# Patient Record
Sex: MALE | Employment: UNEMPLOYED | URBAN - METROPOLITAN AREA
[De-identification: names, ages, dates, MRNs, and addresses within clinical notes are randomized per-mention and may not be internally consistent; named-entity substitution may affect disease eponyms.]

---

## 2023-10-05 SDOH — HEALTH STABILITY: PHYSICAL HEALTH: ON AVERAGE, HOW MANY DAYS PER WEEK DO YOU ENGAGE IN MODERATE TO STRENUOUS EXERCISE (LIKE A BRISK WALK)?: 1 DAY

## 2023-10-05 SDOH — HEALTH STABILITY: PHYSICAL HEALTH: ON AVERAGE, HOW MANY MINUTES DO YOU ENGAGE IN EXERCISE AT THIS LEVEL?: 30 MIN

## 2023-10-05 ASSESSMENT — SOCIAL DETERMINANTS OF HEALTH (SDOH)
WITHIN THE LAST YEAR, HAVE YOU BEEN HUMILIATED OR EMOTIONALLY ABUSED IN OTHER WAYS BY YOUR PARTNER OR EX-PARTNER?: NO
WITHIN THE LAST YEAR, HAVE YOU BEEN AFRAID OF YOUR PARTNER OR EX-PARTNER?: NO
WITHIN THE LAST YEAR, HAVE TO BEEN RAPED OR FORCED TO HAVE ANY KIND OF SEXUAL ACTIVITY BY YOUR PARTNER OR EX-PARTNER?: NO
WITHIN THE LAST YEAR, HAVE YOU BEEN KICKED, HIT, SLAPPED, OR OTHERWISE PHYSICALLY HURT BY YOUR PARTNER OR EX-PARTNER?: NO

## 2023-10-06 ENCOUNTER — OFFICE VISIT (OUTPATIENT)
Dept: PRIMARY CARE CLINIC | Age: 38
End: 2023-10-06

## 2023-10-06 VITALS
WEIGHT: 212.05 LBS | OXYGEN SATURATION: 99 % | SYSTOLIC BLOOD PRESSURE: 120 MMHG | TEMPERATURE: 97.4 F | BODY MASS INDEX: 25.82 KG/M2 | HEIGHT: 76 IN | HEART RATE: 98 BPM | DIASTOLIC BLOOD PRESSURE: 78 MMHG

## 2023-10-06 DIAGNOSIS — R53.83 FATIGUE, UNSPECIFIED TYPE: ICD-10-CM

## 2023-10-06 DIAGNOSIS — Z86.19 HISTORY OF HEPATITIS C VIRUS INFECTION: ICD-10-CM

## 2023-10-06 DIAGNOSIS — R53.83 FATIGUE, UNSPECIFIED TYPE: Primary | ICD-10-CM

## 2023-10-06 LAB
25(OH)D3 SERPL-MCNC: 59.5 NG/ML
ALBUMIN SERPL-MCNC: 4.8 G/DL (ref 3.4–5)
ALBUMIN/GLOB SERPL: 1.9 {RATIO} (ref 1.1–2.2)
ALP SERPL-CCNC: 100 U/L (ref 40–129)
ALT SERPL-CCNC: 76 U/L (ref 10–40)
ANION GAP SERPL CALCULATED.3IONS-SCNC: 10 MMOL/L (ref 3–16)
AST SERPL-CCNC: 58 U/L (ref 15–37)
BILIRUB SERPL-MCNC: 1 MG/DL (ref 0–1)
BUN SERPL-MCNC: 12 MG/DL (ref 7–20)
CALCIUM SERPL-MCNC: 9.6 MG/DL (ref 8.3–10.6)
CHLORIDE SERPL-SCNC: 101 MMOL/L (ref 99–110)
CO2 SERPL-SCNC: 29 MMOL/L (ref 21–32)
CREAT SERPL-MCNC: 1.1 MG/DL (ref 0.9–1.3)
DEPRECATED RDW RBC AUTO: 13.2 % (ref 12.4–15.4)
GFR SERPLBLD CREATININE-BSD FMLA CKD-EPI: >60 ML/MIN/{1.73_M2}
GLUCOSE SERPL-MCNC: 93 MG/DL (ref 70–99)
HCT VFR BLD AUTO: 41.5 % (ref 40.5–52.5)
HGB BLD-MCNC: 14.4 G/DL (ref 13.5–17.5)
MCH RBC QN AUTO: 32 PG (ref 26–34)
MCHC RBC AUTO-ENTMCNC: 34.7 G/DL (ref 31–36)
MCV RBC AUTO: 92.2 FL (ref 80–100)
PLATELET # BLD AUTO: 290 K/UL (ref 135–450)
PMV BLD AUTO: 8.6 FL (ref 5–10.5)
POTASSIUM SERPL-SCNC: 4.6 MMOL/L (ref 3.5–5.1)
PROT SERPL-MCNC: 7.3 G/DL (ref 6.4–8.2)
RBC # BLD AUTO: 4.5 M/UL (ref 4.2–5.9)
SODIUM SERPL-SCNC: 140 MMOL/L (ref 136–145)
TSH SERPL DL<=0.005 MIU/L-ACNC: 3.48 UIU/ML (ref 0.27–4.2)
WBC # BLD AUTO: 6.9 K/UL (ref 4–11)

## 2023-10-06 PROCEDURE — 99203 OFFICE O/P NEW LOW 30 MIN: CPT | Performed by: FAMILY MEDICINE

## 2023-10-06 SDOH — ECONOMIC STABILITY: FOOD INSECURITY: WITHIN THE PAST 12 MONTHS, THE FOOD YOU BOUGHT JUST DIDN'T LAST AND YOU DIDN'T HAVE MONEY TO GET MORE.: NEVER TRUE

## 2023-10-06 SDOH — ECONOMIC STABILITY: FOOD INSECURITY: WITHIN THE PAST 12 MONTHS, YOU WORRIED THAT YOUR FOOD WOULD RUN OUT BEFORE YOU GOT MONEY TO BUY MORE.: NEVER TRUE

## 2023-10-06 SDOH — ECONOMIC STABILITY: HOUSING INSECURITY
IN THE LAST 12 MONTHS, WAS THERE A TIME WHEN YOU DID NOT HAVE A STEADY PLACE TO SLEEP OR SLEPT IN A SHELTER (INCLUDING NOW)?: NO

## 2023-10-06 SDOH — ECONOMIC STABILITY: INCOME INSECURITY: HOW HARD IS IT FOR YOU TO PAY FOR THE VERY BASICS LIKE FOOD, HOUSING, MEDICAL CARE, AND HEATING?: NOT HARD AT ALL

## 2023-10-06 ASSESSMENT — ENCOUNTER SYMPTOMS
RESPIRATORY NEGATIVE: 1
ALLERGIC/IMMUNOLOGIC NEGATIVE: 1
SHORTNESS OF BREATH: 0
WHEEZING: 0
APNEA: 0
EYES NEGATIVE: 1
BACK PAIN: 1
ABDOMINAL PAIN: 0

## 2023-10-06 ASSESSMENT — PATIENT HEALTH QUESTIONNAIRE - PHQ9
1. LITTLE INTEREST OR PLEASURE IN DOING THINGS: 0
SUM OF ALL RESPONSES TO PHQ QUESTIONS 1-9: 0
SUM OF ALL RESPONSES TO PHQ QUESTIONS 1-9: 0
2. FEELING DOWN, DEPRESSED OR HOPELESS: 0
SUM OF ALL RESPONSES TO PHQ QUESTIONS 1-9: 0
SUM OF ALL RESPONSES TO PHQ QUESTIONS 1-9: 0
SUM OF ALL RESPONSES TO PHQ9 QUESTIONS 1 & 2: 0

## 2023-10-06 NOTE — PROGRESS NOTES
SUBJECTIVE:  Patient ID: Isaías Gay is a 44 y.o. male. Chief Complaint:  Chief Complaint   Patient presents with    ADHD    Fatigue       HPI  44year old Male  First visit  Fatigue x 4 week  He lived in [facility]  He moved to area 4 year ago    Self employed  Pt was in penitentiary x 3 year Drug charges  Test HCV was negative 2023    Past Medical History:   Diagnosis Date    ADHD (attention deficit hyperactivity disorder)     Anxiety     Hepatitis C virus infection     told year      History reviewed. No pertinent surgical history. Allergies   Allergen Reactions    Tramadol Seizure       Family History   Problem Relation Age of Onset    Arthritis Mother         72year old    High Blood Pressure Mother     High Cholesterol Mother     Alcohol Abuse Father         77year old    Other Brother           Disable    Stroke Maternal Grandmother     Diabetes Maternal Aunt      Social History     Social History Narrative    Out correction Aug 2023    Drug charges    Daughter 15  from best friend    Daughter 6  from x wife    Tobacco use 5 cig /day    No alcohol    Pt lives with Fiance     With Fiance x 5 year even while in long term         Current Outpatient Medications   Medication Sig Dispense Refill    Buprenorphine ER 8 MG/0.16ML SOSY        No current facility-administered medications for this visit. No results found for: \"WBC\", \"HGB\", \"HCT\", \"MCV\", \"PLT\"  No results found for: \"CHOL\"  No results found for: \"TRIG\"  No results found for: \"HDL\"  No results found for: \"LDLCHOLESTEROL\", \"LDLCALC\"  No results found for: \"LABVLDL\", \"VLDL\"  No results found for: \"CHOLHDLRATIO\"    Chemistry    No results found for: \"NA\", \"K\", \"CL\", \"CO2\", \"BUN\", \"CREATININE\", \"GLU\" No results found for: \"CALCIUM\", \"ALKPHOS\", \"AST\", \"ALT\", \"BILITOT\"         Review of Systems   Constitutional:         Active  Put himself on Emma list  Pickle ball  Help GF with house hold   HENT: Negative. Eyes: Negative.

## 2023-10-07 LAB — HCV AB SERPL QL IA: REACTIVE

## 2025-04-11 ENCOUNTER — HOSPITAL ENCOUNTER (EMERGENCY)
Age: 40
Discharge: HOME | End: 2025-04-11
Attending: STUDENT IN AN ORGANIZED HEALTH CARE EDUCATION/TRAINING PROGRAM

## 2025-04-11 ENCOUNTER — APPOINTMENT (OUTPATIENT)
Age: 40
End: 2025-04-11

## 2025-04-11 VITALS
TEMPERATURE: 97.8 F | WEIGHT: 267 LBS | RESPIRATION RATE: 15 BRPM | HEIGHT: 76 IN | BODY MASS INDEX: 32.51 KG/M2 | DIASTOLIC BLOOD PRESSURE: 70 MMHG | HEART RATE: 51 BPM | OXYGEN SATURATION: 95 % | SYSTOLIC BLOOD PRESSURE: 111 MMHG

## 2025-04-11 DIAGNOSIS — R04.2 HEMOPTYSIS: Primary | ICD-10-CM

## 2025-04-11 LAB
ALBUMIN SERPL-MCNC: 4.2 G/DL (ref 3.4–5)
ALBUMIN/GLOB SERPL: 1.2 {RATIO}
ALP SERPL-CCNC: 102 U/L (ref 40–129)
ALT SERPL-CCNC: 77 U/L (ref 10–40)
ANION GAP SERPL CALCULATED.3IONS-SCNC: 9 MMOL/L (ref 3–16)
AST SERPL-CCNC: 57 U/L (ref 15–37)
BASOPHILS # BLD: 0.03 K/UL (ref 0–0.2)
BASOPHILS NFR BLD: 1 %
BILIRUB SERPL-MCNC: 0.4 MG/DL (ref 0–1)
BUN SERPL-MCNC: 8 MG/DL (ref 7–20)
CALCIUM SERPL-MCNC: 9 MG/DL (ref 8.3–10.6)
CHLORIDE SERPL-SCNC: 102 MMOL/L (ref 99–110)
CO2 SERPL-SCNC: 28 MMOL/L (ref 21–32)
CREAT SERPL-MCNC: 1 MG/DL (ref 0.7–1.8)
D DIMER PPP FEU-MCNC: 0.28 UG/ML FEU (ref 0–0.6)
EOSINOPHIL # BLD: 0.35 K/UL (ref 0–0.6)
EOSINOPHILS RELATIVE PERCENT: 5 %
ERYTHROCYTE [DISTWIDTH] IN BLOOD BY AUTOMATED COUNT: 12.4 % (ref 12.4–15.4)
GFR, ESTIMATED: >90 ML/MIN/1.73M2
GLUCOSE SERPL-MCNC: 115 MG/DL (ref 70–99)
HCT VFR BLD AUTO: 37.3 % (ref 40.5–52.5)
HGB BLD-MCNC: 12.7 G/DL (ref 13.5–17.5)
IMM GRANULOCYTES # BLD AUTO: 0.01 K/UL (ref 0–0.5)
IMM GRANULOCYTES NFR BLD: 0 %
INR PPP: 0.9 (ref 0.9–1.2)
LYMPHOCYTES NFR BLD: 2.61 K/UL (ref 1–5.1)
LYMPHOCYTES RELATIVE PERCENT: 40 %
MCH RBC QN AUTO: 31 PG (ref 26–34)
MCHC RBC AUTO-ENTMCNC: 34 G/DL (ref 31–36)
MCV RBC AUTO: 91 FL (ref 80–100)
MONOCYTES NFR BLD: 0.71 K/UL (ref 0–1.3)
MONOCYTES NFR BLD: 11 %
NEUTROPHILS NFR BLD: 44 %
NEUTS SEG NFR BLD: 2.87 K/UL (ref 1.7–7.7)
PLATELET # BLD AUTO: 237 K/UL (ref 135–450)
PMV BLD AUTO: 10 FL (ref 9.4–12.4)
POTASSIUM SERPL-SCNC: 4 MMOL/L (ref 3.5–5.1)
PROT SERPL-MCNC: 7.5 G/DL (ref 6.4–8.2)
PROTHROMBIN TIME: 11.9 SEC (ref 11.9–14.9)
RBC # BLD AUTO: 4.1 M/UL (ref 4.2–5.9)
SODIUM SERPL-SCNC: 139 MMOL/L (ref 136–145)
WBC OTHER # BLD: 6.6 K/UL (ref 4–11)

## 2025-04-11 PROCEDURE — 99284 EMERGENCY DEPT VISIT MOD MDM: CPT

## 2025-04-11 PROCEDURE — 85379 FIBRIN DEGRADATION QUANT: CPT

## 2025-04-11 PROCEDURE — 80053 COMPREHEN METABOLIC PANEL: CPT

## 2025-04-11 PROCEDURE — 71250 CT THORAX DX C-: CPT

## 2025-04-11 PROCEDURE — 85025 COMPLETE CBC W/AUTO DIFF WBC: CPT

## 2025-04-11 PROCEDURE — 85610 PROTHROMBIN TIME: CPT

## 2025-04-11 RX ORDER — DOXYCYCLINE HYCLATE 100 MG
100 TABLET ORAL 2 TIMES DAILY
Qty: 14 TABLET | Refills: 0 | Status: SHIPPED | OUTPATIENT
Start: 2025-04-11 | End: 2025-04-18

## 2025-04-11 RX ORDER — GUAIFENESIN/DEXTROMETHORPHAN 100-10MG/5
5 SYRUP ORAL 3 TIMES DAILY PRN
Qty: 120 ML | Refills: 0 | Status: SHIPPED | OUTPATIENT
Start: 2025-04-11 | End: 2025-04-21

## 2025-04-11 RX ORDER — METHADONE HYDROCHLORIDE 5 MG/5ML
SOLUTION ORAL EVERY 4 HOURS PRN
COMMUNITY

## 2025-04-11 ASSESSMENT — LIFESTYLE VARIABLES
HOW MANY STANDARD DRINKS CONTAINING ALCOHOL DO YOU HAVE ON A TYPICAL DAY: PATIENT DOES NOT DRINK
HOW OFTEN DO YOU HAVE A DRINK CONTAINING ALCOHOL: NEVER

## 2025-04-11 ASSESSMENT — PAIN - FUNCTIONAL ASSESSMENT: PAIN_FUNCTIONAL_ASSESSMENT: NONE - DENIES PAIN

## 2025-04-11 NOTE — ED PROVIDER NOTES
[facility] EMERGENCY DEPARTMENT     EMERGENCY DEPARTMENT ENCOUNTER         Pt Name: Montana Reese   MRN: [id number]   Birthdate 1985   Date of evaluation: 4/11/2025   Provider: Feng Shi MD   PCP: Esmer Pham MD   Note Started: 5:40 PM EDT 4/11/25       Chief Complaint     Hemoptysis (Pt reports a cough x1 month with occasional coughing up blood. Pt reports the blood as \"bright red\". Report fatigue, denies other symptoms. Reports cough worsens at night. )      History of Present Illness     Montana Reese is a 39 y.o. male who presents with concern for hemoptysis.  The patient has no major contributing past medical history though is a longtime smoker.  He is companied by his wife who assists with the history.  They state that for the past 1 month he has developed a dry hacking cough more so at night there was no illness as a prodrome.  No history of similar.  His coughing is particular severe now every night and he occasionally expectorates some blood-tinged sputum and given the persistence he now presents for evaluation.  He has no other acute complaints.  Again no preceding illness.  No recent travel or immobilization to increase his risk for thromboembolic disease.      Review of Systems     Positives and pertinent negatives as per HPI.    Past Medical, Surgical, Family, and Social History     He has a past medical history of ADHD (attention deficit hyperactivity disorder), Anxiety, and Hepatitis C virus infection.  He has no past surgical history on file.  His family history includes Alcohol Abuse in his father; Arthritis in his mother; Diabetes in his maternal aunt; High Blood Pressure in his mother; High Cholesterol in his mother; Other in his brother; Stroke in his maternal grandmother.  He reports that he has been smoking cigarettes. He started smoking about 26 years ago. He has a 6.6 pack-year smoking history. He has never used smokeless tobacco. He reports that he does not

## 2025-04-11 NOTE — ED NOTES
Rounded to address patient needs. Patient awake, alert and oriented. Skin natural in color. Respirations even and unlabored. Patient denies further needs at this time. Call light within reach.

## 2025-04-11 NOTE — ED TRIAGE NOTES
Pt reports a cough x1 month with occasional coughing up blood. Pt reports the blood as \"bright red\". Report fatigue, denies other symptoms. Reports cough worsens at night. Pt reports low grade fevers that he has been treating with Tylenol.    Patient oriented to room and ED throughput process.  Safety measures with ED bed locked in lowest position and call light in reach.  Patient educated on all orders, including any medications.  Patient educated on chief complaint/symptoms. Patient encouraged to ask questions regarding care, medications or treatment plan.  Patient aware of how to reach staff with questions/concerns.

## 2025-04-12 NOTE — ED NOTES
Verbal and written discharge instructions along with 2 prescription(s) discussed with patient. Patient verbally acknowledged all information provided. Patient is alert and acting appropriately for developmental age. Skin p/w/d. Respirations even and unlabored. Patient is to follow up as indicated. Encouraged patient to return to ED for any new or worsening symptoms. Patient denies any additional needs or concerns to note.

## 2025-04-12 NOTE — DISCHARGE INSTRUCTIONS
Return to emergency department if persistent or worsening coughing difficulty breathing coughing up blood fever worse in any way or any problems.    Take all medications as directed with food until finished.      As discussed follow-up with family doctor for recheck and to ensure resolution of lung congestion as discussed, for recheck lymph nodes and lung and abdomen, for liver lesions, for enlarged spleen.    Stop smoking cigarettes and vaping.

## 2025-04-13 NOTE — ED PROVIDER NOTES
Some care of this patient from Dr. Shi at 1800.  Please see his history and physical for full details regarding this encounter.  In short patient reports persistent cough x 4 weeks.  He states cough is worse in the evening and recently has been mixed with streaks of bright red blood.  He denies pain of any kind.  Patient was resting comfortably at the time my evaluation    Alert awake oriented x 3 resting comfortably  Heart rate rhythm regular, lungs clear to auscultation, abdomen soft nontender positive bowel sounds no mass or guarding.  Pulses equal bilaterally    Patient rested comfortably during ED stay.  D-dimer was normal.  All other laboratory studies unremarkable.  CT chest without contrast was ordered results are noted below.      CT CHEST WO CONTRAST   Final Result      1.  Nonspecific right upper lobe groundglass airspace opacities.   2.  Mediastinal and abdominal lymphadenopathy.    3.  Indeterminate subcentimeter liver lesions.  Consider follow-up MRI abdomen, liver mass protocol without and with intravenous contrast.   4.  Splenomegaly.   5.  Small gastroesophageal hiatal hernia.   6.  Sequelae of chronic granulomatous disease       Electronically signed by Salo Gotti DO          All findings were discussed with the patient..  Patient does smoke and vape I counseled him extensively on cessation of both and risk of continued use.    Diagnostic considerations to include infectious etiology i.e. airspace disease pulmonary embolism malignancy.  All findings of radiographic imaging were discussed with the patient.  He is advised of the need for recheck and close outpatient follow-up to be sure the groundglass opacities resolved, to further evaluate mediastinal and abdominal lymphadenopathy and also to further evaluate liver lesions with liver mass protocol.  Patient is also aware of other findings to include hiatal hernia and splenomegaly.  Patient will be started on doxycycline.  Recommended close